# Patient Record
Sex: MALE | Race: WHITE | NOT HISPANIC OR LATINO | URBAN - METROPOLITAN AREA
[De-identification: names, ages, dates, MRNs, and addresses within clinical notes are randomized per-mention and may not be internally consistent; named-entity substitution may affect disease eponyms.]

---

## 2017-08-03 ENCOUNTER — EMERGENCY (EMERGENCY)
Facility: HOSPITAL | Age: 37
LOS: 1 days | Discharge: HOME | End: 2017-08-03
Attending: EMERGENCY MEDICINE | Admitting: EMERGENCY MEDICINE
Payer: COMMERCIAL

## 2017-08-03 VITALS
WEIGHT: 173.06 LBS | RESPIRATION RATE: 18 BRPM | OXYGEN SATURATION: 100 % | HEART RATE: 66 BPM | DIASTOLIC BLOOD PRESSURE: 98 MMHG | TEMPERATURE: 97 F | SYSTOLIC BLOOD PRESSURE: 135 MMHG

## 2017-08-03 VITALS
DIASTOLIC BLOOD PRESSURE: 95 MMHG | SYSTOLIC BLOOD PRESSURE: 143 MMHG | RESPIRATION RATE: 18 BRPM | OXYGEN SATURATION: 100 % | TEMPERATURE: 98 F | HEART RATE: 77 BPM
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
## 2017-08-03 DIAGNOSIS — M25.512 PAIN IN LEFT SHOULDER: ICD-10-CM
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
## 2017-08-03 DIAGNOSIS — S22.32XA FRACTURE OF ONE RIB, LEFT SIDE, INITIAL ENCOUNTER FOR CLOSED FRACTURE: ICD-10-CM

## 2017-08-03 PROCEDURE — 99284 EMERGENCY DEPT VISIT MOD MDM: CPT

## 2017-08-03 PROCEDURE — 73010 X-RAY EXAM OF SHOULDER BLADE: CPT | Mod: 26,LT

## 2017-08-03 PROCEDURE — 73010 X-RAY EXAM OF SHOULDER BLADE: CPT

## 2017-08-03 PROCEDURE — 71046 X-RAY EXAM CHEST 2 VIEWS: CPT

## 2017-08-03 PROCEDURE — 71100 X-RAY EXAM RIBS UNI 2 VIEWS: CPT | Mod: 26,LT

## 2017-08-03 PROCEDURE — 99284 EMERGENCY DEPT VISIT MOD MDM: CPT | Mod: 25

## 2017-08-03 PROCEDURE — 71100 X-RAY EXAM RIBS UNI 2 VIEWS: CPT

## 2017-08-03 PROCEDURE — 71020: CPT | Mod: 26

## 2017-08-03 RX ORDER — OXYCODONE AND ACETAMINOPHEN 5; 325 MG/1; MG/1
2 TABLET ORAL ONCE
Qty: 0 | Refills: 0 | Status: DISCONTINUED | OUTPATIENT
Start: 2017-08-03 | End: 2017-08-03

## 2017-08-03 RX ADMIN — OXYCODONE AND ACETAMINOPHEN 2 TABLET(S): 5; 325 TABLET ORAL at 09:08

## 2017-08-03 RX ADMIN — OXYCODONE AND ACETAMINOPHEN 2 TABLET(S): 5; 325 TABLET ORAL at 10:38

## 2017-08-03 NOTE — ED PROVIDER NOTE - RESPIRATORY, MLM
, pendleton[perficial abrasion to left lateral ribs 6 x 4 inches, + ttp underlying abrasion of left ribs, no flail chest, Breath sounds clear and equal bilaterally, symmetrical chest wall expansion

## 2017-08-03 NOTE — ED ADULT NURSE NOTE - OBJECTIVE STATEMENT
Pt presented to ED with left upper back and right rib pain, stating "I fell on stairs and landed on my back, and hurt my shoulder blade." Limited ROM noted to LUE, redness noted to the site, +radial, pulse, capillary refill < 2sec. No LOC, no blood thinner, no active bleeding, no head injury. Pt is A&O x 3, able to speak coherently in full sentences. Pt also reports difficulty in taking deep breathe due to pain. Respiration unlabored and even, skin warm and non-diaphoretic, NAD noted. Pt presented to ED with left upper back and right rib pain, stating "I fell on stairs and landed on my back, and hurt my shoulder blade." Limited ROM noted to LUE, redness noted to left upper back, +radial, pulse, capillary refill < 2sec. No LOC, no blood thinner, no active bleeding, no head injury. Pt is A&O x 3, able to speak coherently in full sentences. Pt also reports difficulty taking deep breathe due to pain. Pt denies chest pain, dizziness, fever, chills nor any other symptom at present. Respiration unlabored and even, skin warm and non-diaphoretic, NAD noted.

## 2017-08-03 NOTE — ED PROVIDER NOTE - OBJECTIVE STATEMENT
RT 36 yo otherwise healthy m presents to ed for left sided rib pain after falling down 4 stairs.  pt lost balance and landed on the edge of staircase and then slid down 4 stairs.  Pt reports only pain/injury to left ribs worse with deep breathing.  Pt otherwise denies head injury, back pain, abdominal pain, nausea, vomiting, other musculoskeletal pain, numbness/tingling, flank pain

## 2017-08-03 NOTE — ED ADULT TRIAGE NOTE - CHIEF COMPLAINT QUOTE
patient fell down stairs and hit the bottom of his left shoulder. no with left shoulder and rib pain, hard to take a deep breath without pain. breath sounds auscultated bilat

## 2017-08-03 NOTE — ED PROVIDER NOTE - ATTENDING CONTRIBUTION TO CARE
Pt is a 36yo m, who p/w left rib/ shoulder blade pain s/p fall down stairs today. Pt denies any head trauma/ loc. No abd pain, flank pain, hematuria, neck pain, sob, chest pain... Afebrile. HDS. WNWD m in nad. No midline c-t-l spine tenderness. + s1, s2, rrr. Lungs cta b/l. Abd soft, nt/nd, no guarding/ rebound. No flank tenderness. + abrasion to inferior/ lateral border of left scapula, as well as ttp to left lateral chest wall, no crepitus. Xrays of chest, ribs, and scapula neg for acute fx, dislocation, effusion, ptx. Findings d/w pt who was advised on supportive care and f/u with pcp for re-evaluation.

## 2017-08-03 NOTE — ED PROVIDER NOTE - MEDICAL DECISION MAKING DETAILS
Case d/w Dr. Carvajal, RT 36 yo otherwise healthy m presents to ed for left sided rib pain after falling down 4 stairs.  pt lost balance and landed on the edge of staircase and then slid down 4 stairs. Pt on exam w/ ttp of l;eft lateral ribs.  pt given percocet for pain, xray of left ribs and cxr obtained, + fx to left 8th rib, no other obvious fracture, no ptx or effusion seen.  Pt reports feeling much improved after pain medication, will send home with incentive spirometer

## 2020-11-23 NOTE — ED ADULT TRIAGE NOTE - CADM TRG TX PRIOR TO ARRIVAL
Right shoulder complete views received from  imaging  Obtained 11/23/2020  Impression: normal examination  Reviewed by Dr. Welsh: negative    none